# Patient Record
Sex: MALE | Race: ASIAN
[De-identification: names, ages, dates, MRNs, and addresses within clinical notes are randomized per-mention and may not be internally consistent; named-entity substitution may affect disease eponyms.]

---

## 2023-01-05 ENCOUNTER — HOSPITAL ENCOUNTER (INPATIENT)
Dept: HOSPITAL 54 - ER | Age: 23
LOS: 2 days | Discharge: HOME | DRG: 343 | End: 2023-01-07
Attending: INTERNAL MEDICINE | Admitting: INTERNAL MEDICINE
Payer: OTHER GOVERNMENT

## 2023-01-05 VITALS — WEIGHT: 166 LBS | BODY MASS INDEX: 26.06 KG/M2 | HEIGHT: 67 IN

## 2023-01-05 DIAGNOSIS — K38.1: ICD-10-CM

## 2023-01-05 DIAGNOSIS — R74.8: ICD-10-CM

## 2023-01-05 DIAGNOSIS — K40.20: ICD-10-CM

## 2023-01-05 DIAGNOSIS — Z20.822: ICD-10-CM

## 2023-01-05 DIAGNOSIS — K35.80: Primary | ICD-10-CM

## 2023-01-05 DIAGNOSIS — D64.9: ICD-10-CM

## 2023-01-05 DIAGNOSIS — E87.6: ICD-10-CM

## 2023-01-05 LAB
ALBUMIN SERPL BCP-MCNC: 4.4 G/DL (ref 3.4–5)
ALP SERPL-CCNC: 52 U/L (ref 46–116)
ALT SERPL W P-5'-P-CCNC: 20 U/L (ref 12–78)
AST SERPL W P-5'-P-CCNC: 26 U/L (ref 15–37)
BASOPHILS # BLD AUTO: 0 K/UL (ref 0–0.2)
BASOPHILS NFR BLD AUTO: 0.2 % (ref 0–2)
BILIRUB DIRECT SERPL-MCNC: 0.2 MG/DL (ref 0–0.2)
BILIRUB SERPL-MCNC: 0.5 MG/DL (ref 0.2–1)
BILIRUB UR QL STRIP: NEGATIVE
BUN SERPL-MCNC: 8 MG/DL (ref 7–18)
CALCIUM SERPL-MCNC: 9.2 MG/DL (ref 8.5–10.1)
CHLORIDE SERPL-SCNC: 104 MMOL/L (ref 98–107)
CO2 SERPL-SCNC: 30 MMOL/L (ref 21–32)
COLOR UR: YELLOW
CREAT SERPL-MCNC: 1.1 MG/DL (ref 0.6–1.3)
DEPRECATED SQUAMOUS URNS QL MICRO: (no result) /HPF
EOSINOPHIL NFR BLD AUTO: 1.7 % (ref 0–6)
GLUCOSE SERPL-MCNC: 93 MG/DL (ref 74–106)
GLUCOSE UR STRIP-MCNC: NEGATIVE MG/DL
HCT VFR BLD AUTO: 43 % (ref 39–51)
HGB BLD-MCNC: 14.4 G/DL (ref 13.5–17.5)
LEUKOCYTE ESTERASE UR QL STRIP: NEGATIVE
LIPASE SERPL-CCNC: 78 U/L (ref 73–393)
LYMPHOCYTES NFR BLD AUTO: 1.8 K/UL (ref 0.8–4.8)
LYMPHOCYTES NFR BLD AUTO: 27.3 % (ref 20–44)
MCHC RBC AUTO-ENTMCNC: 34 G/DL (ref 31–36)
MCV RBC AUTO: 87 FL (ref 80–96)
MONOCYTES NFR BLD AUTO: 0.4 K/UL (ref 0.1–1.3)
MONOCYTES NFR BLD AUTO: 7 % (ref 2–12)
NEUTROPHILS # BLD AUTO: 4.1 K/UL (ref 1.8–8.9)
NEUTROPHILS NFR BLD AUTO: 63.8 % (ref 43–81)
NITRITE UR QL STRIP: NEGATIVE
PH UR STRIP: 7 [PH] (ref 5–8)
PLATELET # BLD AUTO: 168 K/UL (ref 150–450)
POTASSIUM SERPL-SCNC: 3.2 MMOL/L (ref 3.5–5.1)
PROT SERPL-MCNC: 7.7 G/DL (ref 6.4–8.2)
PROT UR QL STRIP: NEGATIVE MG/DL
RBC # BLD AUTO: 4.89 MIL/UL (ref 4.5–6)
RBC #/AREA URNS HPF: (no result) /HPF (ref 0–2)
SODIUM SERPL-SCNC: 139 MMOL/L (ref 136–145)
UROBILINOGEN UR STRIP-MCNC: 0.2 EU/DL
WBC #/AREA URNS HPF: (no result) /HPF (ref 0–3)
WBC NRBC COR # BLD AUTO: 6.4 K/UL (ref 4.3–11)

## 2023-01-05 PROCEDURE — C9113 INJ PANTOPRAZOLE SODIUM, VIA: HCPCS

## 2023-01-05 PROCEDURE — C9803 HOPD COVID-19 SPEC COLLECT: HCPCS

## 2023-01-05 PROCEDURE — G0378 HOSPITAL OBSERVATION PER HR: HCPCS

## 2023-01-05 NOTE — NUR
BIBS. TO ER BED 3. AAOX4. NOT IN RESP DISTRESS. AMBULATORY. CAME IN FOR LOWER 
ABD PAIN X 1 DAY. REPORTS NORMAL BM. HOWEVER ENDORSES URINARY FREQUENCY AND 
URGENCY. PT IS AFEBRILE. URINE COLLECTED. AWAITING FOR EVAL

## 2023-01-06 VITALS — DIASTOLIC BLOOD PRESSURE: 64 MMHG | SYSTOLIC BLOOD PRESSURE: 110 MMHG

## 2023-01-06 VITALS — DIASTOLIC BLOOD PRESSURE: 60 MMHG | SYSTOLIC BLOOD PRESSURE: 113 MMHG

## 2023-01-06 VITALS — DIASTOLIC BLOOD PRESSURE: 63 MMHG | SYSTOLIC BLOOD PRESSURE: 119 MMHG

## 2023-01-06 VITALS — SYSTOLIC BLOOD PRESSURE: 119 MMHG | DIASTOLIC BLOOD PRESSURE: 63 MMHG

## 2023-01-06 VITALS — DIASTOLIC BLOOD PRESSURE: 75 MMHG | SYSTOLIC BLOOD PRESSURE: 127 MMHG

## 2023-01-06 LAB
BASOPHILS # BLD AUTO: 0 K/UL (ref 0–0.2)
BASOPHILS NFR BLD AUTO: 0.4 % (ref 0–2)
BUN SERPL-MCNC: 7 MG/DL (ref 7–18)
CALCIUM SERPL-MCNC: 8.3 MG/DL (ref 8.5–10.1)
CHLORIDE SERPL-SCNC: 108 MMOL/L (ref 98–107)
CK SERPL-CCNC: 217 U/L (ref 39–308)
CO2 SERPL-SCNC: 27 MMOL/L (ref 21–32)
CREAT SERPL-MCNC: 0.9 MG/DL (ref 0.6–1.3)
EOSINOPHIL NFR BLD AUTO: 1.1 % (ref 0–6)
GLUCOSE SERPL-MCNC: 92 MG/DL (ref 74–106)
HCT VFR BLD AUTO: 38 % (ref 39–51)
HGB BLD-MCNC: 12.9 G/DL (ref 13.5–17.5)
LYMPHOCYTES NFR BLD AUTO: 1.4 K/UL (ref 0.8–4.8)
LYMPHOCYTES NFR BLD AUTO: 21.6 % (ref 20–44)
MAGNESIUM SERPL-MCNC: 2.1 MG/DL (ref 1.8–2.4)
MCHC RBC AUTO-ENTMCNC: 34 G/DL (ref 31–36)
MCV RBC AUTO: 87 FL (ref 80–96)
MONOCYTES NFR BLD AUTO: 0.5 K/UL (ref 0.1–1.3)
MONOCYTES NFR BLD AUTO: 7.4 % (ref 2–12)
NEUTROPHILS # BLD AUTO: 4.6 K/UL (ref 1.8–8.9)
NEUTROPHILS NFR BLD AUTO: 69.5 % (ref 43–81)
PHOSPHATE SERPL-MCNC: 4 MG/DL (ref 2.5–4.9)
PLATELET # BLD AUTO: 142 K/UL (ref 150–450)
POTASSIUM SERPL-SCNC: 3.8 MMOL/L (ref 3.5–5.1)
RBC # BLD AUTO: 4.32 MIL/UL (ref 4.5–6)
SODIUM SERPL-SCNC: 143 MMOL/L (ref 136–145)
WBC NRBC COR # BLD AUTO: 6.6 K/UL (ref 4.3–11)

## 2023-01-06 PROCEDURE — 0DTJ4ZZ RESECTION OF APPENDIX, PERCUTANEOUS ENDOSCOPIC APPROACH: ICD-10-PCS | Performed by: SURGERY

## 2023-01-06 RX ADMIN — Medication PRN MG: at 09:27

## 2023-01-06 RX ADMIN — SODIUM CHLORIDE SCH MG: 9 INJECTION, SOLUTION INTRAVENOUS at 09:26

## 2023-01-06 RX ADMIN — Medication PRN MG: at 22:27

## 2023-01-06 RX ADMIN — PIPERACILLIN SODIUM AND TAZOBACTAM SODIUM SCH MLS/HR: .375; 3 INJECTION, POWDER, LYOPHILIZED, FOR SOLUTION INTRAVENOUS at 14:00

## 2023-01-06 RX ADMIN — PIPERACILLIN SODIUM AND TAZOBACTAM SODIUM SCH MLS/HR: .375; 3 INJECTION, POWDER, LYOPHILIZED, FOR SOLUTION INTRAVENOUS at 22:16

## 2023-01-06 RX ADMIN — POTASSIUM CHLORIDE SCH MLS/HR: 200 INJECTION, SOLUTION INTRAVENOUS at 01:36

## 2023-01-06 RX ADMIN — POTASSIUM CHLORIDE SCH MLS/HR: 200 INJECTION, SOLUTION INTRAVENOUS at 00:31

## 2023-01-06 RX ADMIN — POTASSIUM CHLORIDE SCH MLS/HR: 200 INJECTION, SOLUTION INTRAVENOUS at 02:07

## 2023-01-06 RX ADMIN — POTASSIUM CHLORIDE SCH MLS/HR: 200 INJECTION, SOLUTION INTRAVENOUS at 03:32

## 2023-01-06 RX ADMIN — Medication PRN MG: at 04:20

## 2023-01-06 NOTE — NUR
MS LVN INITIAL NOTES

 RECEIVED REPORT FROM AM NURSE AKILAH AND OR NURSE CAME TO  THE PATIENT FOR SURGERY. 
PT IS ALERT ORIENTED. NO SIGNS OF ANY ACUTE PAIN OR ANY DISCOMFORT NOTED. WILL CONTINUE 
MONITORING. 

-------------------------------------------------------------------------------

Addendum: 01/06/23 at 1924 by WALTER PRITCHETT LVN

-------------------------------------------------------------------------------

DISREGARD WRONG TIME DOCUMENTATION.

## 2023-01-06 NOTE — NUR
MS RN ADMISSION NOTE



RECEIVED REPORT FROM ER RN AL AND PATIENT CAME TO THE UNIT AT AROUND 0105 VIA GURNEY WITH 
ER STAFF. PATIENT WAS ADMITTED D/T LOWER ABDOMINAL PAIN AND URINARY FREQUENCY AND URGENCY. 
DIRECTED TO ROOM. PATIENT IS ALERT AND ORIENTED X4. ABLE TO COMMUNICATE NEEDS WITH THE 
STAFFS. AFEBRILE AND NOT IN ANY FORM OF ACUTE DISTRESS. BREATHING EVEN AND NON LABORED. LUNG 
SOUNDS CLEAR ON AUSCULTATION. WITH MINIMAL PAIN AT THIS TIME. PATIENT NOTED WITH IV ACCESS 
ON LAC 18G RUNNING WITH 10MEQ KCL THAT WAS STARTER IN THE ER PER REPORT. EXPLAINED ADMISSION 
PROCESS AND AGREED TO IT. PATIENT IS AMBULATORY AND NO SKIN ISSUE WAS NOTED. VITALS TAKEN 
AND AS FOLLOWS: BP- 119/63, P- 68, T- 97.9, R- 20, O2 SAT ON RA 99%. ORIENTED TO 
ENVIRONMENT. LIST OF BELONGINGS ACCOMPLISHED BY ASSIGNED STAFF. ADVISED THE PATIENT TO 
REMAIN ON NPO AS ORDERED. SAFETY MEASURES IN PLACE. KEPT BED IN LOCKED AND IN LOW POSITION. 
SIDE RAILS UP X2. ADVISED TO USE THE CALL LIGHT WHEN IN NEED OF ASSISTANCE.

## 2023-01-06 NOTE — NUR
ms lvn notes

 Received pt from recovery room S/p Laparoscopic appendectomy and got report from recovery 
nurse. Pt is alert oriented x4, no signs fo any N/V and any discomfort noted at this time. 
Re- start the IVF D51/2NS at 125 ml/hr as ordered. Vital signs taken  /75, pulse 80, 
resp.18, temp 97.9 and o2 sat 99%. kept him comfortable at all times. Ice pack also applied 
for pt comfort. will continue monitoring.

## 2023-01-06 NOTE — NUR
ms rn

received on bed, awake,alert,oriented x4,not in any form of distress, respirations even and 
unlabored,no sob noted noted, lungs are clear,abdomen soft,positive bowel sounds, denies 
pain at this time, will monitor patient.

## 2023-01-06 NOTE — NUR
MS RN NOTE



PATIENT IN BED, ASLEEP BUT EASY TO AROUSE AND RESPONSIVE. ABLE TO MAKE NEEDS KNOWN. AFEBRILE 
AND NOT IN ANY FORM OF ACUTE DISTRESS.  BREATHING EVEN AND NON LABORED. LUNG SOUNDS CLEAR ON 
AUSCULTATION. WITH IV ACCESS ON LAC 18G RUNNING WITH D5 1/2 NS AT 125ML/HR. ADVISED THE 
PATIENT TO REMAIN ON NPO AS ORDERED. MEDICATED AS ORDERED. ON IV ATB, MONITORED FOR ANY 
ADVERSE REACTION. SAFETY MEASURES IN PLACE. KEPT BED IN LOCKED AND IN LOW POSITION. SIDE 
RAILS UP X2. ADVISED TO USE THE CALL LIGHT WHEN IN NEED OF ASSISTANCE. ALL NURSING NEEDS 
ATTENDED. ENDORSED TO INCOMING SHIFT FOR CONTINUITY OF CARE.

## 2023-01-06 NOTE — NUR
MS RN

STILL WAITING FOR SURGERY TO BE DONE, OR DEPARTMENT , AWARE,STILL ONE CASE GOING ON, PATIENT 
WILL BE NEXT.

## 2023-01-06 NOTE — NUR
MS LVN INITIAL NOTES

 RECEIVED REPORT FROM AM NURSE AKILAH , OR NURSE CAME TO  THE PT FOR SURGERY. DX OF 
APPENDICITIS. PT IS ALERT ORIENTED X4, NO SIGNS OF ANY PAIN NOTED AT THIS TIME. WILL 
CONTINUE MONITORING.

## 2023-01-06 NOTE — NUR
ms lvn notes

 Called Pharmacy to verified if its ok to give Zosyn IVPB that was due at 21:00 because pt 
had Ancef at recovery room. "OK ' to give per pharmacy even its due an hr ago. will continue 
monitoring.

## 2023-01-07 VITALS — SYSTOLIC BLOOD PRESSURE: 110 MMHG | DIASTOLIC BLOOD PRESSURE: 60 MMHG

## 2023-01-07 VITALS — DIASTOLIC BLOOD PRESSURE: 60 MMHG | SYSTOLIC BLOOD PRESSURE: 112 MMHG

## 2023-01-07 RX ADMIN — SODIUM CHLORIDE SCH MG: 9 INJECTION, SOLUTION INTRAVENOUS at 08:39

## 2023-01-07 RX ADMIN — Medication PRN TAB: at 16:09

## 2023-01-07 RX ADMIN — Medication PRN TAB: at 08:40

## 2023-01-07 RX ADMIN — PIPERACILLIN SODIUM AND TAZOBACTAM SODIUM SCH MLS/HR: .375; 3 INJECTION, POWDER, LYOPHILIZED, FOR SOLUTION INTRAVENOUS at 12:35

## 2023-01-07 RX ADMIN — PIPERACILLIN SODIUM AND TAZOBACTAM SODIUM SCH MLS/HR: .375; 3 INJECTION, POWDER, LYOPHILIZED, FOR SOLUTION INTRAVENOUS at 05:06

## 2023-01-07 NOTE — NUR
RN MS NOTES

PT AWAKE, ALERT AND ORIENTED, ABLE TO AMBULATE INSIDE HIS ROOM WITH STEADY GAIT, ABLE TO DO 
NUMBER 2 TODAY, TOLERATES REGULAR FOOD, SEEN AND EXAMINED BY CHERI NP FOR DR. MISHRA, 
CLEARED FOR DISCHARGE, DISCHARGE ORDER GIVEN BY JUAN, DISCHARGE AND MEDICATION 
INSTRUCTIONS PROVIDED TO PT, VERBALIZED UNDERSTANDING, NO BLEEDING NOTED TO ABDOMINAL 
LAPAROSCOPIC SITES, STERISTRIPS INTACT AND DRY, BELONGINGS ACCOUNTED FOR, ASSISTED TO 
HOSPITAL LOBBY VIA WHEELCHAIR, LEFT VIA PRIVATE CAR IN STABLE CONDITION.

## 2023-01-07 NOTE — NUR
MS LVN CLOSING NOTES

 PT BACK TO SLEEP , DENIES ANY PAIN OR ANY DISCOMFORT AT THIS TIME. ALL DUE MEDS GIVEN AND 
ALL NEEDS MET. KEPT HIM COMFORTABLE AT ALL TIMES. DRESSING DRY AND INTACT, STILL WITH ICE 
PACK FOR PT COMFORT. IVPB ZOSYN INFUSING AT 25ML/HR AS ORDERED. WILL ENDORSE TO AM NURSE FOR 
CONTINUITY OF CARE, PLACE CALL LIGHT AT REACH

## 2023-01-07 NOTE — NUR
RN MS NOTES

PT IN BED, AWAKE, ALERT AND ORIENTED, DENIES PAIN AT THIS TIME, RESPIRATIONS NORMAL, CALL 
LIGHT WITHIN REACH, IV FLUIDS INFUSING WELL.